# Patient Record
Sex: FEMALE | Race: WHITE | URBAN - METROPOLITAN AREA
[De-identification: names, ages, dates, MRNs, and addresses within clinical notes are randomized per-mention and may not be internally consistent; named-entity substitution may affect disease eponyms.]

---

## 2020-11-05 ENCOUNTER — OFFICE VISIT (OUTPATIENT)
Dept: SURGERY | Age: 70
End: 2020-11-05
Payer: MEDICARE

## 2020-11-05 ENCOUNTER — NURSE NAVIGATOR (OUTPATIENT)
Dept: OTHER | Age: 70
End: 2020-11-05

## 2020-11-05 VITALS
BODY MASS INDEX: 35.7 KG/M2 | TEMPERATURE: 97.4 F | HEART RATE: 61 BPM | DIASTOLIC BLOOD PRESSURE: 63 MMHG | SYSTOLIC BLOOD PRESSURE: 154 MMHG | OXYGEN SATURATION: 98 % | WEIGHT: 194 LBS | HEIGHT: 62 IN

## 2020-11-05 DIAGNOSIS — Z17.1 MALIGNANT NEOPLASM OF UPPER-OUTER QUADRANT OF RIGHT BREAST IN FEMALE, ESTROGEN RECEPTOR NEGATIVE (HCC): Primary | ICD-10-CM

## 2020-11-05 DIAGNOSIS — C50.411 MALIGNANT NEOPLASM OF UPPER-OUTER QUADRANT OF RIGHT BREAST IN FEMALE, ESTROGEN RECEPTOR NEGATIVE (HCC): Primary | ICD-10-CM

## 2020-11-05 PROCEDURE — 99205 OFFICE O/P NEW HI 60 MIN: CPT | Performed by: SURGERY

## 2020-11-05 NOTE — PROGRESS NOTES
Michael Acharya is a 65 y.o. female (: 1950) presenting to address:    Chief Complaint   Patient presents with    New Patient     New dx'd right breast cancer/ Biopsy Done 10/27/20       Medication list and allergies have been reviewed with Parveenjustyna Acharya and updated as of today's date. I have gone over all Medical, Surgical and Social History with Parveenjustyna Acharya and updated/added the information accordingly.

## 2020-11-05 NOTE — PATIENT INSTRUCTIONS
If you have any questions or concerns about today's appointment, the verbal and/or written instructions you were given for follow up care, please call our office at [phone]. [facility] - [address], [address] 
 
[phone] office [phone] fax [facility] Dr. Sukumar Estrada (Medical Oncology) [address] [facility], [address] [facility], [address] [place] [address] Road [phone] Dr. Ezequiel Fernández (Radiation Oncology) 1 [facility], [address] [facility], [address] [place] [address] Road

## 2020-11-05 NOTE — NURSE NAVIGATOR
Initial assessment for [employer], newly diagnosed with right triple negative breast cancer. Meeting with pt included pt only. Patient was assessed for the following barriers:    Communication:       Yes    No  -Ability to read/write,understand [place]       [x]      []    -Ability to talk with family/children,friends about diagnosis     [x]      []    -Other:  Comments/Referrals/Services provided:  Patient is able to discuss diagnosis with family and friends. Employment/Financial/Legal:     Yes    No  -Loss of employment/income         []      [x]    -Insurance coverage          [x]      []     -Needs help applying for Social Security/Disability/FMLA     []      [x]     -Help with Co-Pays for office visits         []      [x]    -Medication assistance/medical supplies or equipment     []      [x]    -Difficulty paying bills: utilities/housing       []      [x]    -Means to buy food                     [x]      []    -Legal issues           []      [x]    -Other:  Comments/Referrals/Services provided: Patient is retired. She has insurance with Medicare/[employer]. No financial concerns at this time. Psychosocial        Yes    No  Housing:  -Homeless           []      [x]    -Extended care needs: long term care/home care/Hospice     []      [x]    -Other:  Comments/Referrals/Services provided:  Patient owns home. Transportation:       Yes    No  -Lack of vehicle or public transportation options      []      [x]    -Funds need for public transportation: bus/taxi      []      [x]    -Other:  Comments/Referrals/Services provided: Pt owns vehicle, able to drive self to appointments. Support system:       Yes No  -Family members at home: spouse/significant other/children    [x]      []    -Has a support system         [x]      []    Other:  Comments/Referrals/Services provided:  Pt lives with  who is disabled. CNA comes to the home 3 x weekly from 8-11 to assist with care needs of .  Daughter Burak Cristobal  is main support system. She also has friends as support. Spirituality:        Yes No  -Spiritual issues          []      [x]    -Cultural concerns          []      [x]    -Other:  -Comments/Referrals/Services provided:  No concerns, pt is [employer] by [employer]  Sexuality:        Yes No  -Body image concerns                     [x]      [x]    -Relationships/significant other issues        []      [x]    -Other:  Comments/Referrals/Services provided: Pt is concerned about being over weight. She also wears an estrogen patch and is concerned that if she discontinues it she will lose her hair. States that she was diagnosed with low estrogen over 15 years ago after losing her hair. Coping:        Yes    No  -Able to manage emotions         [x]      []    -Feeling fearful or anxious         [x]      []    -Interest in attending support groups        []      [x]    -Cancer related pain/control         []      [x]    -Other:  Comments/Referrals/Services provided:  Patient admits to having anxiety at times, was prescribed xanax by PCP. Referred to . Pt refused counseling at this time  Tobacco dependency:      Yes No  -Currently smokes: cigarettes/cigars        []      [x]    -Uses smokeless tobacco         []      [x]    -Other:  Comments/Referrals/Services provided:  Denies smoking.     Education/review of Disease process and Management   Yes No  -Explanation of navigator role/contact information      [x]      []    New Patient Guide for Breast Cancer provided                      [x]     []         -Pathology/Staging          [x]      []    -Diagnostic tests          [x]      []    -Genetic testing needed         []      []    -Treatment options/plan: surgery/chemotherapy/radiation     [x]      []    -Mediport placement as needed                              []      []    -Tobacco cessation as needed        []      [x]    -Need for a second opinion         []      [x]    -Importance of bringing family/friends to medical appts     [x]      []   -Other:  Patient/family verbalized understanding of treatment plan: Yes. Pili Wetzel discussed breast cancer treatment options with patient. After discussion, pt opted to have right partial mastectomy with localization and sentinel node biopsy. Pt was referred to med/onc to discuss neoadjuvant chemotherapy, however pt states that she does not want chemotherapy. Referral was sent to med/onc- Dr. Eliza Garza office for consultation. Referral also sent to rad/onc-Dr. Ortiz's office for pt to discuss radiation therapy. All questions answered. NCCN Distress Tool    Alvaro Sheets was assessed for management of distress using the NCCN Distress Thermometer for Patients tool. Mild to moderate distress:   Scorin-4        Yes    No    -Practical/physical issues       [x]      []      -Provide community resources      [x]      []      -Provide list of support groups      [x]      []      -Provide list of national organizations and websites               [x]      []      -Provide ongoing supportive care by oncology medical team        [x]      []                  Comments/Referrals/Services provided:  Yes. Score-10. Pt referred to . Refused counseling at this time. Moderate to severe distress  Scorin or greater                   Yes    No    -Navigator consulted with MD      [x]      []     -Navigator follow up         []      []     -Spiritual concerns        [x]      []     -Emotional/family concerns       []      [x]     -Refer to /mental health professional/PCP   [x]      []      Comments/Referral/Services provided:  Yes. Pt referred to .

## 2020-11-05 NOTE — LETTER
11/5/2020 12:54 PM 
 
Patient:  Nima Carrasco YOB: 1950 Date of Visit: 11/5/2020 Romy Lopez MD 
[address] [place] [address] [address] [address] VIA Facsimile: [phone] Earl Hansen NP 
[address] [facility] [address] [place] [address] VIA Facsimile: [phone] Dear MD Earl Hernandez NP, 
 
 
I had the pleasure of seeing Ms. Zoë Ashley in my office today for her newly diagnosed breast cancer. I am including a copy of my office visit today. If you have questions, please do not hesitate to call me. I look forward to following Ms. Garcia along with you and will keep you updated as to her progress. Sincerely, Devonte Coronel MD

## 2020-11-05 NOTE — PROGRESS NOTES
Breast Cancer Consult    Ms. Jed Treviño is a 65year old woman who was referred for right triple negative breast cancer, s/p core biopsy 10/27/20. The area of concern was identified on screening imaging. She denies palpable mass. She denies nipple discharge. She denies breast pain. There is family history of breast cancer in her mother and sister. Her most recent previous mammogram was 10/2/19. She has difficulty with range of motion right shoulder. Breast/GYN history:    OB History    No obstetric history on file. Past Medical History:   Diagnosis Date    Alopecia     Arthritis     ASHD (arteriosclerotic heart disease)     Bursitis     Coronary artery disease     DM II (diabetes mellitus, type II), controlled ([facility] [address]     History of PTCA     HTN (hypertension)     HX OTHER MEDICAL     pregnancy    Hypercholesteremia     Hyperlipidemia     Hypertension     F/U    Left shoulder pain     Multiple lipomas     Multiple lipomas     Mulitple Lipomas on both arms    Obesity     S/P cardiac catheterization     Vertigo     CHRONIC    Vision decreased        Past Surgical History:   Procedure Laterality Date    [facility] SHOULDER SURGERY PROCEDURE Right     HX CARPAL TUNNEL RELEASE      HX  SECTION      HX CORONARY STENT PLACEMENT  2009    HX HEART CATHETERIZATION  2009    HX HERNIA REPAIR      HX HYSTERECTOMY      HX OTHER SURGICAL  11    HX SHOULDER REPAIR    HX TONSILLECTOMY         Current Outpatient Medications on File Prior to Visit   Medication Sig Dispense Refill    rosuvastatin (CRESTOR) 5 mg tablet Take 1 Tab by mouth nightly. 90 Tab 1    ergocalciferol (VITAMIN D2) 50,000 unit capsule Take 1 Cap by mouth every seven (7) days. 12 Cap 1    estradiol (MINIVELLE) 0.05 mg/24 hr 1 Patch by TransDERmal route every .  zolpidem (AMBIEN) 5 mg tablet Take 1 Tab by mouth nightly as needed for Sleep.  90 Tab 1    ondansetron (ZOFRAN ODT) 4 mg disintegrating tablet Take 1 Tab by mouth every eight (8) hours as needed for Nausea. 21 Tab 0    Biotin 2,500 mcg cap Take 5,000 Caps by mouth daily.  calcium phosphate-vitamin D3 (CITRACAL + D) 250 mg calcium- 250 unit chew Take 1 Cap by mouth daily.  cholecalciferol, vitamin D3, (VITAMIN D3) 2,000 unit tab Take 1 Tab by mouth two (2) times a day.  fluticasone (FLONASE) 50 mcg/actuation nasal spray 2 Sprays by Both Nostrils route daily as needed for Rhinitis.  MECLIZINE HCL (ANTIVERT PO) Take 5 mg by mouth as needed.  nitroglycerin (NITROSTAT) 0.3 mg SL tablet by SubLINGual route every five (5) minutes as needed.  aspirin delayed-release 81 mg tablet Take  by mouth daily.  valsartan-hydrochlorothiazide (DIOVAN HCT) 160-25 mg per tablet Take 1 Tab by mouth daily. 90 Tab 1    potassium chloride SR (KLOR-CON 10) 10 mEq tablet Take 2 Tabs by mouth daily. 180 Tab 1     No current facility-administered medications on file prior to visit.         Allergies   Allergen Reactions    Adhesive Other (comments)     Rash, itching    Codeine Other (comments)     Gi distress    Morphine Other (comments)     Gi distress       Social History     Tobacco Use    Smoking status: Never Smoker    Smokeless tobacco: Never Used   Substance Use Topics    Alcohol use: Yes     Comment: Socially    Drug use: No       Family History   Problem Relation Age of Onset    Heart Disease Father     Heart Disease Mother     Cancer Mother 79        Breast Cancer    Hypertension Brother     Heart Disease Maternal Grandmother     Cancer Sister 58        Breast Cancer         ROS: positives are bolded  General: fevers, chills, night sweats, fatigue, weight loss, weight gain  GI: nausea, vomiting, abdominal pain, change in bowel habits, hematochezia, melena, GERD  Integ: dermatitis, abnormal moles  HEENT: visual changes, vertigo, epistaxis, dysphagia, hoarseness  Cardiac: chest pain, palpitations, HTN, edema, varicosities  Resp: cough, shortness of breath, wheezing, hemoptysis, snoring, reactive airway disease  : hematuria, dysuria, frequency, urgency, nocturia, stress urinary incontinence   MSK: weakness, joint pain, arthritis  Endocrine:  thyroid disease, polyuria, polydipsia, polyphagia, poor wound healing, heat intolerance, cold intolerance  Lymph/Heme: anemia, bruising, history of blood transfusions  Neuro: dizziness, headache, fainting, seizures, stroke  Psych: anxiety, depression    Physical Exam:  Visit Vitals  BP (!) 154/63 (BP 1 Location: Left arm, BP Patient Position: Sitting)   Pulse 61   Temp 97.4 °F (36.3 °C) (Skin)   Ht 5' 2\" (1.575 m)   Wt 88 kg (194 lb)   SpO2 98%   BMI 35.48 kg/m²       Gen:  No distress  Head: normocephalic, atraumatic  Mouth: Clear, no overt lesions, oral mucosa pink and moist  Neck: supple, no masses, no adenopathy, trachea midline  Resp: clear bilaterally  Cardio: Regular rate and rhythm  Abdomen: soft, nontender, nondistended  Extremeties: warm, well-perfused limited range of motion right arm  Neuro: sensation and strength grossly intact and symmetrical  Psych: alert and oriented to person, place and time  Breasts:   Right: Examined in both the supine and upright positions. There was no supraclavicular, infraclavicular, or axillary lympadenopathy. There were no dominant masses, no skin changes, no asymmetry identified core biopsy wound clean, nodularity upper outer ?hematoma  Left: Examined in both the supine and upright positions. There was no supraclavicular, infraclavicular, or axillary lympadenopathy. There were no dominant masses, no skin changes, no asymmetry identified       Imaging:  10/9/20 right mammo/ultrasound [facility])   Additional imaging confirms a suspicious focal asymmetry in the upper outer right breast. Biopsy under tomosynthesis guidance is recommended.  These results were discussed directly with the patient as well as called to the referring physician's office. Assessment category 4: Suspicious    Signed By: Bunny Lyons MD on 10/9/2020 3:02 PM    10/7/20 bilateral mammo [facility])  Focal asymmetry within the upper outer posterior right breast. Additional mammographic and possible sonographic imaging is recommended    The patient will receive an automated reminder and be contacted by the [address] [place] [address] staff regarding the need for additional imaging and to schedule her diagnostic appointment. Alternatively, the patient can contact the Breast Center scheduling team at [phone] to facilitate scheduling her diagnostic appointment. Assessment Category 0: Incomplete - Need Additional Imaging Evaluation and/or Prior Mammograms for Comparison     Signed By: Bunny Lyons MD on 10/7/2020 10:49 AM      Pathology:  10/27/20  A: Right breast distortion, core biopsy:      - Poorly differentiated invasive ductal carcinoma.      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    - ER, LA, and Her-2 immunostains were performed on A1:         Estrogen receptor:  Negative (0+,0%)         Progesterone receptor:  Negative (0+,0%)         C-erb-B2 (Her-2 antigen):  Indeterminate (2+)         Her-2 by FISH is ordered; an addendum will follow.        Impression:  Patient Active Problem List   Diagnosis Code    Benign hematuria N02.9    Hypertension I10    DM II (diabetes mellitus, type II), controlled ([facility] [address] E11.9    Insomnia G47.00    Hyperlipidemia LDL goal < 70 E78.5    Routine health maintenance Z00.00    Multiple lipomas D17.9    Hx of abdominal wall hernia repair x4 - with mesh in place Z98.890, Z87.19    CAD (coronary artery disease) s/p stent x2 I25.10    Greater trochanteric bursitis of both hips M70.61, M70.62    Obesity E66.9    Vitamin D deficiency E55.9    Nausea alone R11.0    Constipation K59.00    Pancolonic diverticulosis K57.30    Tubular adenoma of colon D12.6    Heart palpitations R00.2    Right lower quadrant pain R10.31    Malignant neoplasm of upper-outer quadrant of right female breast [facility]) C49.0        65year old woman who was referred for right triple negative breast cancer, s/p core biopsy 10/27/20. We discussed that there are many options for treatment of breast cancer. Surgery, chemotherapy, radiation and hormone therapy are all tools that may be used in the treatment of breast cancer. For each patient, we determine which will be most beneficial based on her individual set of circumstances. For some patients all four treatment categories will be recommended. For others one or more of these options are not appropriate. We began by reviewing her imaging thus far as well as pathology in detail. Chemotherapy was addressed. Often chemotherapy is administered after surgery, however due to triple negative status Ms. Aisha Griffin may benefit from neoadjuvant chemotherapy or treatment prior to surgery. Chemotherapy is systemic treatment aimed largely to decrease chance of spread or recurrence of cancer. It may also decrease the size of her cancer to facilitate surgery. It is administered by a medical oncologist.  I have recommended referral to medical oncology for additional information regarding chemotherapy. Chemotherapy is generally administered via port. Regarding surgery, there are two main options, lumpectomy or mastectomy. We discussed both in detail. Overall survival and distant recurrence rates are the same. The decision is generally a personal decision more so than a medical one. Lumpectomy is also known as breast conservation surgery or partial mastectomy. The goal is to remove the area of concern as well as surrounding area of uninvolved tissue (\"clear margins\"). Radiation is almost always recommended with lumpectomy to allow for acceptable local recurrence rates. Local recurrence rates are approximately 6% after lumpectomy with radiation.   Risks, benefits and options were discussed in detail to include, but not limited to, bleeding, infection, risks of anesthesia, injury to surrounding structures and other unforeseen events such as stroke, heart attack or death. Mastectomy was then addressed. With mastectomy, almost all of the breast tissue is removed. We are not able to remove 100% of the breast tissue. The risk of local recurrence is approximately 2-4% after mastectomy. The overlying skin is generally numb. Most often, the numbness is permanent. Mastectomy can be performed with or without reconstruction. The reconstruction is performed by the plastic surgeon. Commonly it is a multi-step process with placement of tissue expanders as the first step. If she is interested in reconstruction, I will refer her to plastic surgery. Often we are able to perform a nipple sparing mastectomy with reconstruction. The reconstructed breast differs in many ways from the native breast.  The goal is that in a bra or clothing, no one can tell she has had a mastectomy. Radiation generally is not needed after mastectomy. There are some circumstances, usually based on size, margins, local extension or lymph node status, where post-mastectomy radiation is recommended. Risks, benefits and options were discussed in detail to include, but not limited to, bleeding, infection, risks of anesthesia, injury to surrounding structures and other unforeseen events such as stroke, heart attack or death. Routine screening mammogram is not recommended after mastectomy. As she is clinically node negative, she is a candidate for sentinel node biopsy. We discussed this procedure is a targeted sampling of the axillary lymph nodes to allow for staging of her disease. She will be injected with radioactive isotope as well as blue dye to allow for mapping and removal of the sentinel nodes.   By removing only the sentinel nodes and not performing axillary node dissection, she has a decreased risk of lymphedema (arm swelling) and nerve injury. We did specifically discuss that both of these risks still exist.   Risks, benefits and options were discussed in detail to include, but not limited to, bleeding, infection, risks of anesthesia, injury to surrounding structures and other unforeseen events such as stroke, heart attack or death. If a significant amount of cancer is noted in her lymph nodes, an axillary lymph node dissection may be recommended. In this procedure more, but not all, of the lymph nodes under the arm are removed. The chance of both lymphedema and nerve injury are increased with axillary node dissection compared to sentinel node biopsy. I generally recommend referral to physical therapy and a lymphedema specialist if an axillary node dissection is performed. We discussed radiation. Radiation is a local therapy aimed to decrease the chance of local recurrence. It is administered under the direction of a radiation oncologist.  Radiation is almost always recommended with lumpectomy. It generally is not needed after mastectomy. There are some circumstances, usually based on size, margins, local extension or lymph node status, where post-mastectomy radiation is recommended. Most commonly it is administered five days a week for up to seven weeks. Most of the side effects, with the exception of fatigue, are local.      Anti-hormone or hormone blocking therapy is used in hormone sensitive, estrogen receptor positive breast cancer. It is generally recommended for 5-10 years. There are two categories of hormone blocking medications. Tamoxifen is a selective estrogen reuptake modulator (SERM). There are several aromatase inhibitors. These medications are generally prescribed by a medical oncologist.      I have recommended she stop her estrogen patch.        After discussing the above, Ms. Farzad Moreno prefers right partial mastectomy with localization and sentinel node biopsy. We will schedule medical oncology referral preoperatively as she may be a candidate for neoadjuvant chemotherapy as well as radiation consultation to assure she is able to maintain her arm in an acceptable position for radiation. All questions were answered. She was asked to call with any additional questions or concerns.

## 2020-11-09 ENCOUNTER — DOCUMENTATION ONLY (OUTPATIENT)
Age: 70
End: 2020-11-09

## 2020-11-10 NOTE — PROGRESS NOTES
Oncology Social Work Note   Patient name: Sujatha Gastelum   MRN: [id number]   YOB: 1950     11/09/2020    MSW received referral from nurse navigator with request to contact patient to assess for needs. MSW attempted to reach patient via telephone on this date. No answer to call. MSW left VM message introducing self and reason for call . MSW request return call. MSW will assess for needs and provide support and information as needed and/or requested. Ashlie Shepard, GLADYS, LMSW

## 2020-11-17 ENCOUNTER — DOCUMENTATION ONLY (OUTPATIENT)
Age: 70
End: 2020-11-17

## 2020-11-18 NOTE — PROGRESS NOTES
Oncology Social Work Follow-up Note   Patient name: Richard Foss   MRN: [id number]   YOB: 1950     11/17/2020    MSW spoke with patient on this date. Patient advising that she is receiving care at [facility] that she wanted to get a second opinion. Decline MSW assistance. Ashlie Gonzalez, GLADYS, LMSW